# Patient Record
Sex: MALE | Race: WHITE | NOT HISPANIC OR LATINO | Employment: FULL TIME | URBAN - NONMETROPOLITAN AREA
[De-identification: names, ages, dates, MRNs, and addresses within clinical notes are randomized per-mention and may not be internally consistent; named-entity substitution may affect disease eponyms.]

---

## 2022-01-22 ENCOUNTER — HOSPITAL ENCOUNTER (EMERGENCY)
Facility: HOSPITAL | Age: 47
Discharge: HOME | End: 2022-01-22
Attending: EMERGENCY MEDICINE
Payer: OTHER MISCELLANEOUS

## 2022-01-22 ENCOUNTER — APPOINTMENT (EMERGENCY)
Dept: CT IMAGING | Facility: HOSPITAL | Age: 47
End: 2022-01-22
Payer: OTHER MISCELLANEOUS

## 2022-01-22 VITALS
BODY MASS INDEX: 18.51 KG/M2 | WEIGHT: 160 LBS | HEART RATE: 71 BPM | DIASTOLIC BLOOD PRESSURE: 70 MMHG | OXYGEN SATURATION: 99 % | SYSTOLIC BLOOD PRESSURE: 132 MMHG | HEIGHT: 78 IN | RESPIRATION RATE: 18 BRPM | TEMPERATURE: 97.3 F

## 2022-01-22 DIAGNOSIS — S06.0X9A CONCUSSION: ICD-10-CM

## 2022-01-22 DIAGNOSIS — S00.03XA SCALP HEMATOMA, INITIAL ENCOUNTER: ICD-10-CM
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
## 2022-01-22 PROCEDURE — 70450 CT HEAD/BRAIN W/O DYE: CPT

## 2022-01-22 PROCEDURE — 99282 EMERGENCY DEPT VISIT SF MDM: CPT | Performed by: PHYSICIAN ASSISTANT

## 2022-01-22 PROCEDURE — 99284 EMERGENCY DEPT VISIT MOD MDM: CPT

## 2022-01-22 PROCEDURE — G1004 CDSM NDSC: HCPCS

## 2022-01-22 PROCEDURE — 72125 CT NECK SPINE W/O DYE: CPT

## 2022-01-22 NOTE — ED PROVIDER NOTES
History  Chief Complaint   Patient presents with    Fall     pt fell on ice striking right side head, neck numbness, No LOC, no blood thinners     Patient presents to the emergency department today for evaluation of head injury  This is a 59-year-old male who stepped out of a pickup truck at work this morning, slipped on ice causing the fall and struck front right portion of his head off of the ground  No loss of consciousness  He complains headache neck pain and numbness of the left arm since the incident that occurred within the last hour  He denies blood thinning medications on board  Denies chest pain shortness of breath or any other extremity abnormality other than what is listed above  None       Past Medical History:   Diagnosis Date    Marfan syndrome     Raynaud's disease        History reviewed  No pertinent surgical history  History reviewed  No pertinent family history  I have reviewed and agree with the history as documented  E-Cigarette/Vaping    E-Cigarette Use Never User      E-Cigarette/Vaping Substances     Social History     Tobacco Use    Smoking status: Current Every Day Smoker     Packs/day: 0 25    Smokeless tobacco: Never Used   Vaping Use    Vaping Use: Never used   Substance Use Topics    Alcohol use: Never    Drug use: Never       Review of Systems   Constitutional: Negative for chills and fever  HENT: Negative for ear pain and sore throat  Eyes: Negative for pain and visual disturbance  Respiratory: Negative for cough and shortness of breath  Cardiovascular: Negative for chest pain and palpitations  Gastrointestinal: Negative for abdominal pain and vomiting  Genitourinary: Negative for dysuria and hematuria  Musculoskeletal: Positive for neck pain  Negative for arthralgias and back pain  Skin: Negative for color change and rash  Neurological: Positive for headaches  Negative for seizures and syncope  Hematological: Negative  Psychiatric/Behavioral: Negative  All other systems reviewed and are negative  Physical Exam  Physical Exam  Vitals reviewed  Constitutional:       General: He is not in acute distress  Appearance: Normal appearance  He is normal weight  He is not ill-appearing, toxic-appearing or diaphoretic  HENT:      Head: Normocephalic and atraumatic  Comments: No evidence of Jordan sign     Right Ear: Tympanic membrane, ear canal and external ear normal  There is no impacted cerumen  Left Ear: Tympanic membrane, ear canal and external ear normal  There is no impacted cerumen  Ears:      Comments: Negative hemotympanum bilaterally  No blood in the external canals  Nose: Nose normal  No congestion or rhinorrhea  Comments: Negative septal hematoma  Mouth/Throat:      Mouth: Mucous membranes are moist       Pharynx: Oropharynx is clear  No oropharyngeal exudate or posterior oropharyngeal erythema  Eyes:      General:         Right eye: No discharge  Left eye: No discharge  Extraocular Movements: Extraocular movements intact  Conjunctiva/sclera: Conjunctivae normal       Pupils: Pupils are equal, round, and reactive to light  Cardiovascular:      Rate and Rhythm: Normal rate and regular rhythm  Pulses: Normal pulses  Heart sounds: No murmur heard  No gallop  Pulmonary:      Effort: Pulmonary effort is normal  No respiratory distress  Breath sounds: Normal breath sounds  No stridor  No wheezing, rhonchi or rales  Comments: No evidence of flail chest   Symmetric chest rise is noted  No jugular venous distension  Chest:      Chest wall: No tenderness  Abdominal:      General: Abdomen is flat  Bowel sounds are normal  There is no distension  Palpations: There is no mass  Tenderness: There is no abdominal tenderness  There is no right CVA tenderness, left CVA tenderness, guarding or rebound  Hernia: No hernia is present  Musculoskeletal:         General: No swelling, deformity or signs of injury  Normal range of motion  Cervical back: Normal range of motion  Tenderness present  No rigidity  Right lower leg: No edema  Left lower leg: No edema  Comments:   No crepitus step-offs contusion abrasion laceration or swelling noted of the central spine  There is right frontal hematoma with abrasion no active bleeding noted   Skin:     General: Skin is warm  Coloration: Skin is not jaundiced or pale  Findings: No erythema, lesion or rash  Neurological:      General: No focal deficit present  Mental Status: He is alert and oriented to person, place, and time  Mental status is at baseline  Psychiatric:         Mood and Affect: Mood normal          Vital Signs  ED Triage Vitals [01/22/22 1112]   Temperature Pulse Respirations Blood Pressure SpO2   (!) 97 3 °F (36 3 °C) 71 18 132/70 99 %      Temp Source Heart Rate Source Patient Position - Orthostatic VS BP Location FiO2 (%)   Temporal Monitor Sitting Left arm --      Pain Score       4           Vitals:    01/22/22 1112   BP: 132/70   Pulse: 71   Patient Position - Orthostatic VS: Sitting         Visual Acuity  Visual Acuity      Most Recent Value   L Pupil Size (mm) 3   R Pupil Size (mm) 3          ED Medications  Medications - No data to display    Diagnostic Studies  Results Reviewed     None                 CT head without contrast   Final Result by Natalee Diane MD (01/22 1149)      No acute intracranial abnormality  Workstation performed: [id number]         CT spine cervical without contrast   Final Result by Natalee Diane MD (01/22 1148)      No cervical spine fracture or traumatic malalignment                     Workstation performed: [id number]                    Procedures  Procedures         ED Course  ED Course as of 01/22/22 1220   Sat Jan 22, 2022   1122 SpO2: 99 %   1122 Respirations: 18   1122 Pulse: 71   1122 Temperature(!): 97 3 °F (36 3 °C)   1122 Blood Pressure: 132/70   1219 IMPRESSION:     No acute intracranial abnormality       1219 IMPRESSION:     No cervical spine fracture or traumatic malalignment                                   SBIRT 22yo+      Most Recent Value   SBIRT (22 yo +)    In order to provide better care to our patients, we are screening all of our patients for alcohol and drug use  Would it be okay to ask you these screening questions? Yes Filed at: 01/22/2022 1156   Initial Alcohol Screen: US AUDIT-C     1  How often do you have a drink containing alcohol? 0 Filed at: 01/22/2022 1156   2  How many drinks containing alcohol do you have on a typical day you are drinking? 0 Filed at: 01/22/2022 1156   3a  Male UNDER 65: How often do you have five or more drinks on one occasion? 0 Filed at: 01/22/2022 1156   Audit-C Score 0 Filed at: 01/22/2022 1156   RICHY: How many times in the past year have you    Used an illegal drug or used a prescription medication for non-medical reasons? Never Filed at: 01/22/2022 1156                    MDM    Disposition  Final diagnoses:   Fall, initial encounter   Concussion   Scalp hematoma, initial encounter     Time reflects when diagnosis was documented in both MDM as applicable and the Disposition within this note     Time User Action Codes Description Comment    1/22/2022 12:19 PM Garcia Cho Add [D70  FJAW] Fall, initial encounter     1/22/2022 12:20 PM Garcia Cho Add [S06 [id number]] Concussion     1/22/2022 12:20 PM Garcia Cho Add [S00 [id number]] Scalp hematoma, initial encounter       ED Disposition     ED Disposition Condition Date/Time Comment    Discharge Stable Sat Jan 22, 2022 12:19 PM Yoselyn Washington [facility] discharge to home/self care  Follow-up Information    None         Patient's Medications    No medications on file       No discharge procedures on file      PDMP Review     None          ED Provider  Electronically Signed by Janak Goodson PA-C  01/22/22 3662